# Patient Record
Sex: MALE | Race: WHITE | Employment: FULL TIME | URBAN - METROPOLITAN AREA
[De-identification: names, ages, dates, MRNs, and addresses within clinical notes are randomized per-mention and may not be internally consistent; named-entity substitution may affect disease eponyms.]

---

## 2017-08-10 ENCOUNTER — RADIANT APPOINTMENT (OUTPATIENT)
Dept: GENERAL RADIOLOGY | Facility: CLINIC | Age: 42
End: 2017-08-10
Attending: PHYSICIAN ASSISTANT
Payer: COMMERCIAL

## 2017-08-10 ENCOUNTER — OFFICE VISIT (OUTPATIENT)
Dept: FAMILY MEDICINE | Facility: CLINIC | Age: 42
End: 2017-08-10
Payer: COMMERCIAL

## 2017-08-10 VITALS
WEIGHT: 238 LBS | BODY MASS INDEX: 31.54 KG/M2 | DIASTOLIC BLOOD PRESSURE: 82 MMHG | HEIGHT: 73 IN | HEART RATE: 88 BPM | SYSTOLIC BLOOD PRESSURE: 138 MMHG | TEMPERATURE: 98.6 F

## 2017-08-10 DIAGNOSIS — S93.432A SPRAIN OF TIBIOFIBULAR LIGAMENT OF LEFT ANKLE, INITIAL ENCOUNTER: Primary | ICD-10-CM

## 2017-08-10 DIAGNOSIS — S93.432A SPRAIN OF TIBIOFIBULAR LIGAMENT OF LEFT ANKLE, INITIAL ENCOUNTER: ICD-10-CM

## 2017-08-10 PROCEDURE — 73610 X-RAY EXAM OF ANKLE: CPT | Mod: LT

## 2017-08-10 PROCEDURE — 99214 OFFICE O/P EST MOD 30 MIN: CPT | Performed by: PHYSICIAN ASSISTANT

## 2017-08-10 ASSESSMENT — ENCOUNTER SYMPTOMS
SORE THROAT: 0
SPUTUM PRODUCTION: 0
DIZZINESS: 0
DIAPHORESIS: 0
WEAKNESS: 0
NEUROLOGICAL NEGATIVE: 1
FEVER: 0
WEIGHT LOSS: 0
NERVOUS/ANXIOUS: 0
PHOTOPHOBIA: 0
BLURRED VISION: 0
WHEEZING: 0
DOUBLE VISION: 0
NECK PAIN: 0
MYALGIAS: 0
HALLUCINATIONS: 0
COUGH: 0
PALPITATIONS: 0
LOSS OF CONSCIOUSNESS: 0
ORTHOPNEA: 0
CONSTIPATION: 0
TINGLING: 0
ABDOMINAL PAIN: 0
HEMOPTYSIS: 0
HEARTBURN: 0
DEPRESSION: 0
NAUSEA: 0
EYE REDNESS: 0
DIARRHEA: 0
SEIZURES: 0
FOCAL WEAKNESS: 0
HEADACHES: 0
BLOOD IN STOOL: 0
VOMITING: 0
EYE DISCHARGE: 0
DYSURIA: 0
FREQUENCY: 0
EYE PAIN: 0
SHORTNESS OF BREATH: 0
BACK PAIN: 0
SENSORY CHANGE: 0
INSOMNIA: 0

## 2017-08-10 ASSESSMENT — LIFESTYLE VARIABLES: SUBSTANCE_ABUSE: 0

## 2017-08-10 NOTE — PROGRESS NOTES
HPI  SUBJECTIVE:                                                    Dontae Hi is a 42 year old male who presents to clinic today for left ankle pain that began 4 days ago when he was playing softball. He does not recall a specific significant. His pain has increased over the last couple of days and he has noticed increased swelling      Musculoskeletal problem/pain      Duration: Injury on 8/6/2017 playing softball    Description  Location: left ankle    Intensity:  severe, 7/10    Accompanying signs and symptoms: swelling    History  Previous similar problem: YES- multiple sprains  Previous evaluation:  none    Precipitating or alleviating factors:  Trauma or overuse: YES  Aggravating factors include: walking    Therapies tried and outcome: ice and NSAID - ibuprofen, not helping much            Problem list and histories reviewed & adjusted, as indicated.  Additional history: as documented    Patient Active Problem List   Diagnosis     CARDIOVASCULAR SCREENING; LDL GOAL LESS THAN 160     Elevated BP     Past Surgical History:   Procedure Laterality Date     SURGICAL HISTORY OF -       arthroscopy right knee        Social History   Substance Use Topics     Smoking status: Former Smoker     Packs/day: 0.50     Years: 1.00     Quit date: 2/11/1995     Smokeless tobacco: Former User     Quit date: 2/11/1995     Alcohol use Yes      Comment: 20     Family History   Problem Relation Age of Onset     C.A.D. No family hx of          Current Outpatient Prescriptions   Medication Sig Dispense Refill     NO ACTIVE MEDICATIONS   0     No Known Allergies  Labs reviewed in EPIC      Reviewed and updated as needed this visit by clinical staff     Reviewed and updated as needed this visit by Provider    Review of Systems   Constitutional: Negative for diaphoresis, fever, malaise/fatigue and weight loss.   HENT: Negative for congestion, ear discharge, ear pain, hearing loss, nosebleeds and sore throat.    Eyes: Negative for  blurred vision, double vision, photophobia, pain, discharge and redness.   Respiratory: Negative for cough, hemoptysis, sputum production, shortness of breath and wheezing.    Cardiovascular: Negative for chest pain, palpitations, orthopnea and leg swelling.   Gastrointestinal: Negative for abdominal pain, blood in stool, constipation, diarrhea, heartburn, melena, nausea and vomiting.   Genitourinary: Negative.  Negative for dysuria, frequency and urgency.   Musculoskeletal: Positive for joint pain. Negative for back pain, myalgias and neck pain.   Skin: Negative for itching and rash.   Neurological: Negative.  Negative for dizziness, tingling, sensory change, focal weakness, seizures, loss of consciousness, weakness and headaches.   Endo/Heme/Allergies: Negative.    Psychiatric/Behavioral: Negative for depression, hallucinations, substance abuse and suicidal ideas. The patient is not nervous/anxious and does not have insomnia.          Physical Exam   Constitutional: He is oriented to person, place, and time and well-developed, well-nourished, and in no distress.   HENT:   Head: Normocephalic and atraumatic.   Right Ear: External ear normal.   Left Ear: External ear normal.   Nose: Nose normal.   Mouth/Throat: Oropharynx is clear and moist.   Eyes: Conjunctivae and EOM are normal. Pupils are equal, round, and reactive to light. Right eye exhibits no discharge. Left eye exhibits no discharge. No scleral icterus.   Neck: Normal range of motion. Neck supple. No thyromegaly present.   Cardiovascular: Normal rate, regular rhythm, normal heart sounds and intact distal pulses.  Exam reveals no gallop and no friction rub.    No murmur heard.  Pulmonary/Chest: Effort normal and breath sounds normal. No respiratory distress. He has no wheezes. He has no rales. He exhibits no tenderness.   Abdominal: Soft. Bowel sounds are normal. He exhibits no distension and no mass. There is no tenderness. There is no rebound and no  guarding.   Musculoskeletal: He exhibits no edema.        Left ankle: He exhibits decreased range of motion and swelling. He exhibits no ecchymosis, no deformity and normal pulse. Tenderness. Lateral malleolus tenderness found.   Lymphadenopathy:     He has no cervical adenopathy.   Neurological: He is alert and oriented to person, place, and time. He has normal reflexes. No cranial nerve deficit. He exhibits normal muscle tone. Gait normal. Coordination normal.   Skin: Skin is warm and dry. No rash noted. No erythema.   Psychiatric: Mood, memory, affect and judgment normal.              (B71.064A) Sprain of tibiofibular ligament of left ankle, initial encounter  (primary encounter diagnosis)  Comment:   Plan: XR Ankle Left G/E 3 Views            X-ray shows a slight irregularity in the distal fibula but I do not see an exact fracture line. His symptoms are significant site placed him in a cam walker and will await radiology report. We will contact him with the radiologist result and determine the length of time in the cam walker.

## 2017-08-10 NOTE — MR AVS SNAPSHOT
"              After Visit Summary   8/10/2017    Dontae Hi    MRN: [id number]           Patient Information     Date Of Birth          1975        Visit Information        Provider Department      8/10/2017 2:00 PM Gianluca Campoverde PA-C [facility]        Today's Diagnoses     Sprain of tibiofibular ligament of left ankle, initial encounter    -  1       Follow-ups after your visit        Follow-up notes from your care team     Return if symptoms worsen or fail to improve.      Who to contact     If you have questions or need follow up information about today's clinic visit or your schedule please contact [facility] directly at [phone].  Normal or non-critical lab and imaging results will be communicated to you by MobileGlobehart, letter or phone within 4 business days after the clinic has received the results. If you do not hear from us within 7 days, please contact the clinic through MobileGlobehart or phone. If you have a critical or abnormal lab result, we will notify you by phone as soon as possible.  Submit refill requests through Dajiabao or call your pharmacy and they will forward the refill request to us. Please allow 3 business days for your refill to be completed.          Additional Information About Your Visit        MyChart Information     Dajiabao lets you send messages to your doctor, view your test results, renew your prescriptions, schedule appointments and more. To sign up, go to [email or web address] . Click on \"Log in\" on the left side of the screen, which will take you to the Welcome page. Then click on \"Sign up Now\" on the right side of the page.     You will be asked to enter the access code listed below, as well as some personal information. Please follow the directions to create your username and password.     Your access code is: [email or web address]  Expires: 2017  3:27 PM     Your access code will  in 90 days. If you need help or a new code, please " "call your [facility] or [phone].        Care EveryWhere ID     This is your Care EveryWhere ID. This could be used by other organizations to access your [facility] medical records  [id number]        Your Vitals Were     Pulse Temperature Height BMI (Body Mass Index)          88 98.6  F (37  C) (Tympanic) 6' 1\" (1.854 m) 31.4 kg/m2         Blood Pressure from Last 3 Encounters:   08/10/17 138/82   10/05/16 138/86   02/26/14 165/87    Weight from Last 3 Encounters:   08/10/17 238 lb (108 kg)   10/05/16 242 lb 6.4 oz (110 kg)   02/26/14 234 lb 6.4 oz (106.3 kg)               Primary Care Provider Office Phone # Fax #    Royce Linda [phone] [address] [place] [address]        Equal Access to Services     JADE CALLAHAN : Hadii tina manningo Sojonathan, waaxda luqadaha, qaybta kaalmada adeegyada, maksim otto . So [facility] [phone].    ATENCIÓN: Si husamla español, tiene a schmidt disposición servicios gratuitos de asistencia lingüística. Llame al [phone].    We comply with applicable federal civil rights laws and [place] laws. We do not discriminate on the basis of race, color, national origin, age, disability sex, sexual orientation or gender identity.            Thank you!     Thank you for choosing [facility]  for your care. Our goal is always to provide you with excellent care. Hearing back from our patients is one way we can continue to improve our services. Please take a few minutes to complete the written survey that you may receive in the mail after your visit with us. Thank you!             Your Updated Medication List - Protect others around you: Learn how to safely use, store and throw away your medicines at www.disposemymeds.org.          This list is accurate as of: 8/10/17  3:27 PM.  Always use your most recent med list.                   Brand Name Dispense Instructions for use Diagnosis    NO ACTIVE MEDICATIONS "

## 2017-08-10 NOTE — NURSING NOTE
"Chief Complaint   Patient presents with     Ankle Pain       Initial /82 (BP Location: Right arm, Patient Position: Chair, Cuff Size: Adult Large)  Pulse 88  Temp 98.6  F (37  C) (Tympanic)  Ht 6' 1\" (1.854 m)  Wt 238 lb (108 kg)  BMI 31.4 kg/m2 Estimated body mass index is 31.4 kg/(m^2) as calculated from the following:    Height as of this encounter: 6' 1\" (1.854 m).    Weight as of this encounter: 238 lb (108 kg).  Medication Reconciliation: complete    Health Maintenance that is potentially due pending provider review:  NONE    n/a    Is there anyone who you would like to be able to receive your results? Yes  If yes have patient fill out NOEL    "

## 2019-03-22 ENCOUNTER — OFFICE VISIT (OUTPATIENT)
Dept: URGENT CARE | Facility: URGENT CARE | Age: 44
End: 2019-03-22
Payer: COMMERCIAL

## 2019-03-22 ENCOUNTER — ANCILLARY PROCEDURE (OUTPATIENT)
Dept: GENERAL RADIOLOGY | Facility: CLINIC | Age: 44
End: 2019-03-22
Attending: PHYSICIAN ASSISTANT
Payer: COMMERCIAL

## 2019-03-22 VITALS
SYSTOLIC BLOOD PRESSURE: 170 MMHG | RESPIRATION RATE: 18 BRPM | DIASTOLIC BLOOD PRESSURE: 88 MMHG | TEMPERATURE: 99.4 F | OXYGEN SATURATION: 98 % | HEART RATE: 100 BPM

## 2019-03-22 DIAGNOSIS — S99.912A ANKLE INJURY, LEFT, INITIAL ENCOUNTER: Primary | ICD-10-CM

## 2019-03-22 DIAGNOSIS — S93.402A SPRAIN OF LEFT ANKLE, UNSPECIFIED LIGAMENT, INITIAL ENCOUNTER: ICD-10-CM

## 2019-03-22 DIAGNOSIS — S99.912A ANKLE INJURY, LEFT, INITIAL ENCOUNTER: ICD-10-CM

## 2019-03-22 LAB
BASOPHILS # BLD AUTO: 0 10E9/L (ref 0–0.2)
BASOPHILS NFR BLD AUTO: 0.1 %
DIFFERENTIAL METHOD BLD: NORMAL
EOSINOPHIL # BLD AUTO: 0.2 10E9/L (ref 0–0.7)
EOSINOPHIL NFR BLD AUTO: 2.5 %
ERYTHROCYTE [DISTWIDTH] IN BLOOD BY AUTOMATED COUNT: 12.8 % (ref 10–15)
HCT VFR BLD AUTO: 41.6 % (ref 40–53)
HGB BLD-MCNC: 14.3 G/DL (ref 13.3–17.7)
LYMPHOCYTES # BLD AUTO: 2.1 10E9/L (ref 0.8–5.3)
LYMPHOCYTES NFR BLD AUTO: 30 %
MCH RBC QN AUTO: 30.9 PG (ref 26.5–33)
MCHC RBC AUTO-ENTMCNC: 34.4 G/DL (ref 31.5–36.5)
MCV RBC AUTO: 90 FL (ref 78–100)
MONOCYTES # BLD AUTO: 0.6 10E9/L (ref 0–1.3)
MONOCYTES NFR BLD AUTO: 8.1 %
NEUTROPHILS # BLD AUTO: 4.1 10E9/L (ref 1.6–8.3)
NEUTROPHILS NFR BLD AUTO: 59.3 %
PLATELET # BLD AUTO: 214 10E9/L (ref 150–450)
RBC # BLD AUTO: 4.63 10E12/L (ref 4.4–5.9)
WBC # BLD AUTO: 6.9 10E9/L (ref 4–11)

## 2019-03-22 PROCEDURE — 99213 OFFICE O/P EST LOW 20 MIN: CPT | Performed by: PHYSICIAN ASSISTANT

## 2019-03-22 PROCEDURE — 73610 X-RAY EXAM OF ANKLE: CPT | Mod: LT

## 2019-03-22 PROCEDURE — 36415 COLL VENOUS BLD VENIPUNCTURE: CPT | Performed by: PHYSICIAN ASSISTANT

## 2019-03-22 PROCEDURE — 85025 COMPLETE CBC W/AUTO DIFF WBC: CPT | Performed by: PHYSICIAN ASSISTANT

## 2019-03-22 ASSESSMENT — ENCOUNTER SYMPTOMS
WHEEZING: 0
GASTROINTESTINAL NEGATIVE: 1
ABDOMINAL PAIN: 0
CHEST TIGHTNESS: 0
SHORTNESS OF BREATH: 0
ARTHRALGIAS: 1
FEVER: 0
HEMATURIA: 0
EYE ITCHING: 0
COUGH: 0
HEADACHES: 0
ENDOCRINE NEGATIVE: 1
MYALGIAS: 0
DIARRHEA: 0
PALPITATIONS: 0
RESPIRATORY NEGATIVE: 1
RHINORRHEA: 0
FREQUENCY: 0
POLYDIPSIA: 0
ADENOPATHY: 0
WEAKNESS: 0
DYSURIA: 0
DIAPHORESIS: 0
CARDIOVASCULAR NEGATIVE: 1
CONSTITUTIONAL NEGATIVE: 1
EYE DISCHARGE: 0
LIGHT-HEADEDNESS: 0
NEUROLOGICAL NEGATIVE: 1
EYES NEGATIVE: 1
NAUSEA: 0
SORE THROAT: 0
CHILLS: 0
EYE REDNESS: 0
DIZZINESS: 0
VOMITING: 0

## 2019-03-22 ASSESSMENT — PAIN SCALES - GENERAL: PAINLEVEL: EXTREME PAIN (9)

## 2019-03-23 NOTE — NURSING NOTE
"Chief Complaint   Patient presents with     Musculoskeletal Problem     Stepped in a hole last Saturday and twisted it.  Has just been getting worse since Monday night.  Swelling kicked in Tuesaday. Tried CAM boot during the day. Tried Advil/ Tylenol        Initial /88 (BP Location: Right arm, Patient Position: Chair, Cuff Size: Adult Large)   Pulse 100   Temp 99.4  F (37.4  C) (Tympanic)   Resp 18   SpO2 98%  Estimated body mass index is 31.4 kg/m  as calculated from the following:    Height as of 8/10/17: 1.854 m (6' 1\").    Weight as of 8/10/17: 108 kg (238 lb).    Patient presents to the clinic using Crutches    Health Maintenance that is potentially due pending provider review:  NONE    n/a    Is there anyone who you would like to be able to receive your results? No  If yes have patient fill out NOEL  Elle Islas M.A.        "

## 2019-03-23 NOTE — PROGRESS NOTES
Chief Complaint:    Chief Complaint   Patient presents with     Musculoskeletal Problem     Stepped in a hole last Saturday and twisted it.  Has just been getting worse since Monday night.  Swelling kicked in Tuesaday. Tried CAM boot during the day. Tried Advil/ Tylenol        HPI: Dontae Hi is an 44 year old male who presents for evaluation and treatment of L ankle pain.  Patient stepped in a hole 6 days ago and twisted the ankle.  He has been using a cam walker.  He states that the ankle has become more swollen and painful.  Patient has sprained this ankle in the past.  He denies any numbness or tingling in the L leg.  He is currently not able to walk on the L leg and presents using crutches.      ROS:      Review of Systems   Constitutional: Negative.  Negative for chills, diaphoresis and fever.   HENT: Negative.  Negative for congestion, ear pain, rhinorrhea and sore throat.    Eyes: Negative.  Negative for discharge, redness and itching.   Respiratory: Negative.  Negative for cough, chest tightness, shortness of breath and wheezing.    Cardiovascular: Negative.  Negative for chest pain and palpitations.   Gastrointestinal: Negative.  Negative for abdominal pain, diarrhea, nausea and vomiting.   Endocrine: Negative.  Negative for polydipsia and polyuria.   Genitourinary: Negative for dysuria, frequency, hematuria and urgency.   Musculoskeletal: Positive for arthralgias. Negative for myalgias.   Skin: Negative for rash.   Allergic/Immunologic: Negative for immunocompromised state.   Neurological: Negative.  Negative for dizziness, weakness, light-headedness and headaches.   Hematological: Negative for adenopathy.        Family History   Family History   Problem Relation Age of Onset     C.A.D. No family hx of        Social History  Social History     Socioeconomic History     Marital status: Single     Spouse name: Not on file     Number of children: Not on file     Years of education: Not on file      Highest education level: Not on file   Occupational History     Not on file   Social Needs     Financial resource strain: Not on file     Food insecurity:     Worry: Not on file     Inability: Not on file     Transportation needs:     Medical: Not on file     Non-medical: Not on file   Tobacco Use     Smoking status: Former Smoker     Packs/day: 0.50     Years: 1.00     Pack years: 0.50     Last attempt to quit: 1995     Years since quittin.1     Smokeless tobacco: Former User     Quit date: 1995   Substance and Sexual Activity     Alcohol use: Yes     Comment: 20     Drug use: No     Sexual activity: Yes     Partners: Female   Lifestyle     Physical activity:     Days per week: Not on file     Minutes per session: Not on file     Stress: Not on file   Relationships     Social connections:     Talks on phone: Not on file     Gets together: Not on file     Attends [employer] service: Not on file     Active member of club or organization: Not on file     Attends meetings of clubs or organizations: Not on file     Relationship status: Not on file     Intimate partner violence:     Fear of current or ex partner: Not on file     Emotionally abused: Not on file     Physically abused: Not on file     Forced sexual activity: Not on file   Other Topics Concern     Parent/sibling w/ CABG, MI or angioplasty before 65F 55M? Not Asked   Social History Narrative     Not on file        Surgical History:  Past Surgical History:   Procedure Laterality Date     SURGICAL HISTORY OF -       arthroscopy right knee         Problem List:  Patient Active Problem List   Diagnosis     CARDIOVASCULAR SCREENING; LDL GOAL LESS THAN 160     Elevated BP        Allergies:  No Known Allergies     Current Meds:    Current Outpatient Medications:      NO ACTIVE MEDICATIONS, , Disp: , Rfl: 0     order for DME, Cam walker (Patient not taking: Reported on 3/22/2019), Disp: 1 Units, Rfl: 0     PHYSICAL EXAM:     Vital signs noted and  reviewed by Ronaldo Ochoa  /88 (BP Location: Right arm, Patient Position: Chair, Cuff Size: Adult Large)   Pulse 100   Temp 99.4  F (37.4  C) (Tympanic)   Resp 18   SpO2 98%      PEFR:    Physical Exam   Constitutional: He appears well-developed and well-nourished. He is cooperative.  Non-toxic appearance. He does not have a sickly appearance. He does not appear ill. No distress.   HENT:   Head: Normocephalic and atraumatic.   Right Ear: Hearing, tympanic membrane, external ear and ear canal normal. Tympanic membrane is not perforated, not erythematous, not retracted and not bulging.   Left Ear: Hearing, tympanic membrane, external ear and ear canal normal. Tympanic membrane is not perforated, not erythematous, not retracted and not bulging.   Nose: Nose normal. No mucosal edema or rhinorrhea.   Mouth/Throat: Oropharynx is clear and moist and mucous membranes are normal. No oropharyngeal exudate, posterior oropharyngeal edema, posterior oropharyngeal erythema or tonsillar abscesses. Tonsils are 0 on the right. Tonsils are 0 on the left. No tonsillar exudate.   Eyes: EOM are normal. Pupils are equal, round, and reactive to light.   Neck: Normal range of motion. Neck supple.   Cardiovascular: Normal rate, regular rhythm, S1 normal, S2 normal, normal heart sounds and intact distal pulses. Exam reveals no gallop, no distant heart sounds and no friction rub.   No murmur heard.  Pulmonary/Chest: Effort normal and breath sounds normal. No respiratory distress. He has no decreased breath sounds. He has no wheezes. He has no rhonchi. He has no rales.   Abdominal: Soft. Bowel sounds are normal. He exhibits no distension. There is no tenderness.   Musculoskeletal:        Left ankle: He exhibits decreased range of motion and swelling. He exhibits no deformity. Tenderness. Lateral malleolus and medial malleolus tenderness found.   Significant swelling and erythema of the L ankle.  Reduced range of motion on all axis  due to swelling and pain.  Ankle is warmer to touch.  Pedal pulse is present.  Digits are neurologically intact.   Lymphadenopathy:     He has no cervical adenopathy.   Neurological: He is alert. No cranial nerve deficit.   Skin: Skin is warm and dry. No rash noted. He is not diaphoretic.   Psychiatric: He has a normal mood and affect. His speech is normal and behavior is normal. Judgment and thought content normal. Cognition and memory are normal. He is attentive.   Nursing note and vitals reviewed.       Labs:     Results for orders placed or performed in visit on 03/22/19   CBC with platelets and differential   Result Value Ref Range    WBC 6.9 4.0 - 11.0 10e9/L    RBC Count 4.63 4.4 - 5.9 10e12/L    Hemoglobin 14.3 13.3 - 17.7 g/dL    Hematocrit 41.6 40.0 - 53.0 %    MCV 90 78 - 100 fl    MCH 30.9 26.5 - 33.0 pg    MCHC 34.4 31.5 - 36.5 g/dL    RDW 12.8 10.0 - 15.0 %    Platelet Count 214 150 - 450 10e9/L    % Neutrophils 59.3 %    % Lymphocytes 30.0 %    % Monocytes 8.1 %    % Eosinophils 2.5 %    % Basophils 0.1 %    Absolute Neutrophil 4.1 1.6 - 8.3 10e9/L    Absolute Lymphocytes 2.1 0.8 - 5.3 10e9/L    Absolute Monocytes 0.6 0.0 - 1.3 10e9/L    Absolute Eosinophils 0.2 0.0 - 0.7 10e9/L    Absolute Basophils 0.0 0.0 - 0.2 10e9/L    Diff Method Automated Method          Medical Decision Making:    Differential Diagnosis:  MS Injury Pain: sprain, fracture, tendonitis, muscle strain and contusion      ASSESSMENT:     1. Ankle injury, left, initial encounter    2. Sprain of left ankle, unspecified ligament, initial encounter         PLAN:     XR of the L ankle was negative for any acute fracture per my read.  Patient has significant pain out of character for the injury.  CBC was ordered and was unremarkable  Ice the ankle.  Rest and elevation.  Tylenol and ibuprofen for discomfort.  Ace wrap to help with swelling when walking around.  Continue to use crutches and advance weight bearing as tolerated.  Patient  declined Ortho referral.  He will follow up with his PCP if symptoms are not improving in 2 weeks.  Worrisome symptoms discussed with instructions to go to the ED.  Patient verbalized understanding and agreed with this plan.     Ronaldo Ochoa  3/22/2019, 7:04 PM

## 2022-02-26 ENCOUNTER — HOSPITAL ENCOUNTER (EMERGENCY)
Facility: CLINIC | Age: 47
Discharge: HOME | End: 2022-02-26
Attending: PHYSICIAN ASSISTANT | Admitting: PHYSICIAN ASSISTANT
Payer: COMMERCIAL

## 2022-02-26 ENCOUNTER — OFFICE VISIT (OUTPATIENT)
Dept: URGENT CARE | Facility: URGENT CARE | Age: 47
End: 2022-02-26
Payer: COMMERCIAL

## 2022-02-26 VITALS
HEART RATE: 77 BPM | DIASTOLIC BLOOD PRESSURE: 103 MMHG | WEIGHT: 240 LBS | TEMPERATURE: 97 F | HEIGHT: 73 IN | RESPIRATION RATE: 16 BRPM | BODY MASS INDEX: 31.81 KG/M2 | SYSTOLIC BLOOD PRESSURE: 166 MMHG | OXYGEN SATURATION: 99 %

## 2022-02-26 VITALS
RESPIRATION RATE: 20 BRPM | OXYGEN SATURATION: 98 % | HEART RATE: 102 BPM | SYSTOLIC BLOOD PRESSURE: 158 MMHG | WEIGHT: 240 LBS | DIASTOLIC BLOOD PRESSURE: 84 MMHG | TEMPERATURE: 98.2 F | BODY MASS INDEX: 31.66 KG/M2

## 2022-02-26 DIAGNOSIS — T15.82XA: ICD-10-CM

## 2022-02-26 DIAGNOSIS — H02.814 RETAINED FOREIGN BODY IN LEFT UPPER EYELID: ICD-10-CM

## 2022-02-26 DIAGNOSIS — T15.92XA FOREIGN BODY, EYE, LEFT, INITIAL ENCOUNTER: Primary | ICD-10-CM

## 2022-02-26 PROCEDURE — 250N000009 HC RX 250: Performed by: PHYSICIAN ASSISTANT

## 2022-02-26 PROCEDURE — 99207 PR NON-BILLABLE SERV PER CHARTING: CPT | Performed by: NURSE PRACTITIONER

## 2022-02-26 PROCEDURE — 65222 REMOVE FOREIGN BODY FROM EYE: CPT | Performed by: EMERGENCY MEDICINE

## 2022-02-26 PROCEDURE — 99284 EMERGENCY DEPT VISIT MOD MDM: CPT | Mod: 25 | Performed by: EMERGENCY MEDICINE

## 2022-02-26 PROCEDURE — 65222 REMOVE FOREIGN BODY FROM EYE: CPT

## 2022-02-26 PROCEDURE — 99284 EMERGENCY DEPT VISIT MOD MDM: CPT | Mod: 25

## 2022-02-26 RX ORDER — POLYMYXIN B SULFATE AND TRIMETHOPRIM 1; 10000 MG/ML; [USP'U]/ML
1-2 SOLUTION OPHTHALMIC EVERY 4 HOURS
Qty: 10 ML | Refills: 0 | Status: SHIPPED | OUTPATIENT
Start: 2022-02-26 | End: 2022-03-05

## 2022-02-26 RX ORDER — PROPARACAINE HYDROCHLORIDE 5 MG/ML
1 SOLUTION/ DROPS OPHTHALMIC ONCE
Status: COMPLETED | OUTPATIENT
Start: 2022-02-26 | End: 2022-02-26

## 2022-02-26 RX ADMIN — PROPARACAINE HYDROCHLORIDE 1 DROP: 5 SOLUTION/ DROPS OPHTHALMIC at 12:58

## 2022-02-26 ASSESSMENT — ENCOUNTER SYMPTOMS
EYE REDNESS: 0
PHOTOPHOBIA: 0
EYE PAIN: 0
RESPIRATORY NEGATIVE: 1
EYE ITCHING: 0
CARDIOVASCULAR NEGATIVE: 1
EYE DISCHARGE: 0
CONSTITUTIONAL NEGATIVE: 1

## 2022-02-26 ASSESSMENT — VISUAL ACUITY
OU: 1
OS: 20/20
OD: 20/20

## 2022-02-26 NOTE — DISCHARGE INSTRUCTIONS
To apply eye drops, tip head back, grasp lower eyelid with thumb and index finger, and squeeze bottle to place drop(s) in the eye. Never allow the dropper bottle to touch the eye.    Recommend applying cool compress to affected eye(s) four times per day to relieve irritation and itching. Recommend Zyrtec or Claritin for itching. May also use artificial tears in between antibiotic eyedrop applications.    Return to clinic if symptoms persist more than 5 to 7 days or if you develop symptoms such as sinus pain, headaches, ear pain/discharge.    Advise urgent follow up if you have sudden decreased visual acuity, sudden vision changes, increased pain, headaches, redness/swelling/tenderness around the eyes, fever.

## 2022-02-26 NOTE — PROGRESS NOTES
Has foreign body to left eye.  Eye was numbed and irrigated unable to remove object patient will require an examination with a slit lamp for removal patient will need to be seen in urgent care where they do have a slit lamp if we are not equipped with a slit lamp.    No charge for visit    Cecily Roe CNP

## 2022-02-26 NOTE — ED TRIAGE NOTES
Patient reports a few days ago he got saw dust into his left eye. He believed it was working its way out but started to go lower in the eye and became stuck. Seen at [facility] prior to arrival. Eye was irrigated, FB still remains in left eye. Patient denies any visual disturbances. Does not wear contacts or eye glasses.

## 2022-02-26 NOTE — ED PROVIDER NOTES
History     Chief Complaint   Patient presents with     Foreign Body in Eye     HPI  Dontae Hi is a 47 year old male who presents to the emergency department with a foreign body in his left eye.  He is unsure of when he got the foreign body in his eye but thinks it was 2 days ago.  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  He is unsure of whether he got a piece of sawdust in his eye or piece of metal.  States he first felt it under his eyelid but the foreign body worked its way down.  At one point he thought it was working its way out but it became stuck over the pupil area.  His wife attempted to remove it one day ago but was unsuccessful, was too uncomfortable to work on his eye at that time.  He was seen at [facility] prior to arrival today, was referred here since the foreign body remained in the eye after irrigation.  Denies any acute vision concerns, no trauma, no headaches, no pain with eye movement or photophobia, no drainage from the eye.  The patient does not wear glasses or contacts.  No URI symptoms today, no chest pain or shortness of breath, no difficulties with breathing or swallowing, no nausea or vomiting, no abdominal pain.  No previous injuries to the left eye.    Allergies:  No Known Allergies    Problem List:    Patient Active Problem List    Diagnosis Date Noted     Elevated BP 02/26/2014     Priority: Medium     CARDIOVASCULAR SCREENING; LDL GOAL LESS THAN 160 03/01/2011     Priority: Medium        Past Medical History:    No past medical history on file.    Past Surgical History:    Past Surgical History:   Procedure Laterality Date     SURGICAL HISTORY OF -       arthroscopy right knee        Family History:    Family History   Problem Relation Age of Onset     C.A.D. No family hx of        Social History:  Marital Status:  Single [1]  Social History     Tobacco Use     Smoking status: Former Smoker     Packs/day: 0.50     Years: 1.00     Pack years: 0.50      "Quit date: 1995     Years since quittin.0     Smokeless tobacco: Former User     Quit date: 1995   Substance Use Topics     Alcohol use: Yes     Comment: 20     Drug use: No        Medications:    trimethoprim-polymyxin b (POLYTRIM) 31047-9.1 UNIT/ML-% ophthalmic solution  NO ACTIVE MEDICATIONS  order for DME          Review of Systems   Constitutional: Negative.    HENT: Negative.    Eyes: Positive for visual disturbance. Negative for photophobia, pain, discharge, redness and itching.   Respiratory: Negative.    Cardiovascular: Negative.        Physical Exam   BP: (!) 200/103  Pulse: 98  Temp: 97  F (36.1  C)  Resp: 16  Height: 185.4 cm (6' 1\")  Weight: 108.9 kg (240 lb)  SpO2: 99 %      Physical Exam  Constitutional:       General: He is not in acute distress.     Appearance: Normal appearance. He is not ill-appearing, toxic-appearing or diaphoretic.   HENT:      Head: Normocephalic and atraumatic.      Nose: Nose normal. No congestion or rhinorrhea.      Mouth/Throat:      Mouth: Mucous membranes are moist.      Pharynx: Oropharynx is clear. No oropharyngeal exudate or posterior oropharyngeal erythema.   Eyes:      General: Lids are normal. Vision grossly intact. No visual field deficit or scleral icterus.        Right eye: No discharge.         Left eye: Foreign body present.No discharge.      Extraocular Movements: Extraocular movements intact.      Right eye: Normal extraocular motion and no nystagmus.      Left eye: Normal extraocular motion and no nystagmus.      Conjunctiva/sclera:      Right eye: Right conjunctiva is not injected. No chemosis, exudate or hemorrhage.     Left eye: Left conjunctiva is injected. No chemosis, exudate or hemorrhage.     Pupils: Pupils are equal, round, and reactive to light. Pupils are equal.      Right eye: Pupil is round, reactive and not sluggish.      Left eye: Pupil is round, reactive and not sluggish.      Funduscopic exam:     Right eye: No hemorrhage or " exudate. Red reflex present.         Left eye: No hemorrhage or exudate. Red reflex present.     Slit lamp exam:     Right eye: No corneal flare, corneal ulcer, foreign body, hyphema or hypopyon.      Left eye: No corneal flare, corneal ulcer, foreign body, hyphema or hypopyon.      Visual Fields:      Right eye: DM in the upper temporal quadrant. DM in the upper nasal quadrant. DM in the lower temporal quadrant. DM in the lower nasal quadrant.      Left eye: DM in the upper nasal quadrant. DM in the upper temporal quadrant. DM in the lower nasal quadrant. DM in the lower temporal quadrant.      Comments: Foreign body about 2 mm in diameter noted at 12:00 over the left pupil.  Small foreign body noted at the central portion of the left upper eyelid with eyelid eversion, had fluorescein uptake.  Slit-lamp exam normal after foreign body was removed.   Pulmonary:      Effort: No respiratory distress.      Breath sounds: No wheezing or rales.   Musculoskeletal:         General: No swelling or tenderness. Normal range of motion.      Cervical back: Normal range of motion and neck supple. No rigidity or tenderness. No muscular tenderness.   Skin:     General: Skin is warm and dry.      Findings: No erythema or rash.   Neurological:      General: No focal deficit present.      Mental Status: He is alert and oriented to person, place, and time.      Sensory: No sensory deficit.      Motor: No weakness.      Coordination: Coordination normal.      Gait: Gait normal.   Psychiatric:         Mood and Affect: Mood normal.         Behavior: Behavior normal.         Thought Content: Thought content normal.         Judgment: Judgment normal.         [facility]    Foreign Body Removal - Ocular    Date/Time: 2/26/2022 1:43 PM  Performed by: Jg Daniel PA-C  Authorized by: Jg Daniel PA-C     Risks, benefits and alternatives discussed.      LOCATION      Location:  L corneal    Depth:  Superficial    PRE PROCEDURE DETAILS:     Imaging:  None    OS visual acuity:  20/30    OD visual acuity:  20/20    Correction: uncorrected      Fluorescein exam: yes      Fluorescein uptake: no      ANESTHESIA (see MAR for exact dosages):     Local anesthetic:  Proparacaine drops    PROCEDURE DETAILS     Localization method:  Direct visualization    Removal mechanism:  Moist cotton swab    Foreign bodies recovered:  1    Description:  Small white paint kenyetta-like object    Intact foreign body removal: yes      Residual rust ring observed: no      Residual rust ring removed: no      POST PROCEDURE DETAILS     OS visual acuity:  20/30    OD visual acuity:  20/20    Correction: uncorrected      Confirmation:  No additional foreign bodies on visualization and complete removal verified with slit lamp      PROCEDURE     No corneal abrasion noted. [facility]    Foreign Body Removal - Ocular    Date/Time: 2/26/2022 1:46 PM  Performed by: Jg Daniel PA-C  Authorized by: Jg Daniel PA-C     Risks, benefits and alternatives discussed.      LOCATION     Location:  L upper eyelid    PRE PROCEDURE DETAILS:     Imaging:  None    OS visual acuity:  20/30    OD visual acuity:  20/20    Correction: uncorrected      Fluorescein exam: yes      Fluorescein uptake: yes      ANESTHESIA (see MAR for exact dosages):     Local anesthetic:  Proparacaine drops    PROCEDURE DETAILS     Localization method:  Eyelid eversion    Removal mechanism:  Moist cotton swab    Foreign bodies recovered:  1    Intact foreign body removal: yes      Residual rust ring observed: no      Residual rust ring removed: no      POST PROCEDURE DETAILS     OS visual acuity:  20/30    OD visual acuity:  20/20    Correction: uncorrected      Confirmation:  No additional foreign bodies on visualization and complete removal verified with slit lamp                No results found for this  or any previous visit (from the past 24 hour(s)).    Medications   proparacaine (ALCAINE) 0.5 % ophthalmic solution 1 drop (1 drop Left Eye Given by Other Clinician 2/26/22 3611)       Assessments & Plan (with Medical Decision Making)   The patient is a 47 year old male who presents to the emergency department with a foreign body in his left eye.    No evidence for orbital cellulitis or stye.    Foreign bodies successfully removed as described above.  Starting the patient on antibiotic drops as prophylactic treatment for infection following foreign body removal.    To apply eye drops, tip head back, grasp lower eyelid with thumb and index finger, and squeeze bottle to place drop(s) in the eye. Never allow the dropper bottle to touch the eye.    Recommend applying cool compress to affected eye(s) four times per day to relieve irritation and itching. Recommend Zyrtec or Claritin for itching. May also use artificial tears in between antibiotic eyedrop applications.    Return to clinic if symptoms persist more than 5 to 7 days or if you develop symptoms such as sinus pain, headaches, ear pain/discharge.  Children should not return to school until treated with antibiotic for 24 hours.    Advise urgent follow up if you have sudden decreased visual acuity, sudden vision changes, increased pain, headaches, redness/swelling/tenderness around the eyes, fever.    I have reviewed the nursing notes.    I have reviewed the findings, diagnosis, plan and need for follow up with the patient.      This is a shared visit, history is obtained, foreign body sensation left eye, evaluated by Jg Daniel, foreign body removed at 11:00.  Slit lamp exam shows superficial corneal abrasion at 11:00 no foreign body appreciated.    Agree with contents of note signed Dr. Rodrigo Kamara MD  New Prescriptions    TRIMETHOPRIM-POLYMYXIN B (POLYTRIM) 13014-5.1 UNIT/ML-% OPHTHALMIC SOLUTION    Place 1-2 drops Into the left eye every 4 hours for 7  days       Final diagnoses:   Foreign body of sclera of left eye, initial encounter   Retained foreign body in left upper eyelid       2/26/2022   [facility] EMERGENCY DEPT     Jg Daniel PA-C  02/26/22 1349       Jg Daniel PA-C  02/26/22 [id number]       Rodrigo Kamara MD  02/26/22 6904

## 2025-07-20 ENCOUNTER — HOSPITAL ENCOUNTER (EMERGENCY)
Facility: OTHER | Age: 50
Discharge: HOME | End: 2025-07-20
Payer: COMMERCIAL

## 2025-07-20 VITALS
BODY MASS INDEX: 33.8 KG/M2 | DIASTOLIC BLOOD PRESSURE: 93 MMHG | RESPIRATION RATE: 18 BRPM | TEMPERATURE: 97.5 F | HEART RATE: 98 BPM | WEIGHT: 255 LBS | HEIGHT: 73 IN | OXYGEN SATURATION: 98 % | SYSTOLIC BLOOD PRESSURE: 159 MMHG

## 2025-07-20 DIAGNOSIS — S69.90XA FISH HOOK IN FINGER: ICD-10-CM

## 2025-07-20 PROCEDURE — 250N000011 HC RX IP 250 OP 636

## 2025-07-20 PROCEDURE — 90715 TDAP VACCINE 7 YRS/> IM: CPT

## 2025-07-20 PROCEDURE — 99283 EMERGENCY DEPT VISIT LOW MDM: CPT

## 2025-07-20 PROCEDURE — 90471 IMMUNIZATION ADMIN: CPT

## 2025-07-20 PROCEDURE — 99283 EMERGENCY DEPT VISIT LOW MDM: CPT | Mod: 25

## 2025-07-20 PROCEDURE — 250N000009 HC RX 250

## 2025-07-20 RX ORDER — GINSENG 100 MG
500 CAPSULE ORAL ONCE
Status: COMPLETED | OUTPATIENT
Start: 2025-07-20 | End: 2025-07-20

## 2025-07-20 RX ADMIN — BACITRACIN 1 G: 500 OINTMENT TOPICAL at 13:23

## 2025-07-20 RX ADMIN — CLOSTRIDIUM TETANI TOXOID ANTIGEN (FORMALDEHYDE INACTIVATED), CORYNEBACTERIUM DIPHTHERIAE TOXOID ANTIGEN (FORMALDEHYDE INACTIVATED), BORDETELLA PERTUSSIS TOXOID ANTIGEN (GLUTARALDEHYDE INACTIVATED), BORDETELLA PERTUSSIS FILAMENTOUS HEMAGGLUTININ ANTIGEN (FORMALDEHYDE INACTIVATED), BORDETELLA PERTUSSIS PERTACTIN ANTIGEN, AND BORDETELLA PERTUSSIS FIMBRIAE 2/3 ANTIGEN 0.5 ML: 5; 2; 2.5; 5; 3; 5 INJECTION, SUSPENSION INTRAMUSCULAR at 12:14

## 2025-07-20 RX ADMIN — LIDOCAINE HYDROCHLORIDE 5 ML: 10 INJECTION, SOLUTION EPIDURAL; INFILTRATION; INTRACAUDAL; PERINEURAL at 13:24

## 2025-07-20 ASSESSMENT — ACTIVITIES OF DAILY LIVING (ADL)
ADLS_ACUITY_SCORE: 41
ADLS_ACUITY_SCORE: 41

## 2025-07-20 ASSESSMENT — COLUMBIA-SUICIDE SEVERITY RATING SCALE - C-SSRS
2. HAVE YOU ACTUALLY HAD ANY THOUGHTS OF KILLING YOURSELF IN THE PAST MONTH?: NO
1. IN THE PAST MONTH, HAVE YOU WISHED YOU WERE DEAD OR WISHED YOU COULD GO TO SLEEP AND NOT WAKE UP?: NO
6. HAVE YOU EVER DONE ANYTHING, STARTED TO DO ANYTHING, OR PREPARED TO DO ANYTHING TO END YOUR LIFE?: NO

## 2025-07-20 NOTE — ED TRIAGE NOTES
Fish hook in left thumb.     Triage Assessment (Adult)       Row Name 07/20/25 1142          Triage Assessment    Airway WDL WDL        Respiratory WDL    Respiratory WDL WDL        Peripheral/Neurovascular WDL    Peripheral Neurovascular WDL WDL

## 2025-07-20 NOTE — ED PROVIDER NOTES
"  History     Chief Complaint   Patient presents with    Puncture Wound     Fish hook     The history is provided by the patient and medical records.     Dontae Hi is a 50 year old male who presents to the emergency department today with a fishhook in his left thumb. Last tetanus .     Allergies:  No Known Allergies    Problem List:    Patient Active Problem List    Diagnosis Date Noted    Elevated BP 2014     Priority: Medium    CARDIOVASCULAR SCREENING; LDL GOAL LESS THAN 160 2011     Priority: Medium        Past Medical History:    No past medical history on file.    Past Surgical History:    Past Surgical History:   Procedure Laterality Date    SURGICAL HISTORY OF -       arthroscopy right knee        Family History:    Family History   Problem Relation Age of Onset    C.A.D. No family hx of        Social History:  Marital Status:  Single [1]  Social History     Tobacco Use    Smoking status: Former     Current packs/day: 0.00     Average packs/day: 0.5 packs/day for 1 year (0.5 ttl pk-yrs)     Types: Cigarettes     Start date: 1994     Quit date: 1995     Years since quittin.4    Smokeless tobacco: Former     Quit date: 1995   Substance Use Topics    Alcohol use: Yes     Comment: 20    Drug use: No        Medications:    NO ACTIVE MEDICATIONS  order for DME          Review of Systems   Skin:         Fish hook left thumb       Physical Exam   BP: (!) 190/93  Pulse: 104  Temp: 97.5  F (36.4  C)  Resp: 18  Height: 185.4 cm (6' 1\")  Weight: 115.7 kg (255 lb)  SpO2: 98 %      Physical Exam    ED Course        [facility]    Foreign Body Removal    Date/Time: 2025 12:44 PM    Performed by: Alyssa Pelaez APRN CNP  Authorized by: Alyssa Pelaez APRN CNP    Risks, benefits and alternatives discussed.      LOCATION     Location:  Finger    Finger location:  L thumb    Depth:  Intradermal    Tendon involvement:  None    PRE-PROCEDURE DETAILS     " "Imaging:  None    Neurovascular status: intact    ANESTHESIA (see MAR for exact dosages)     Anesthesia method:  Local infiltration    Local anesthetic:  Lidocaine 1% w/o epi  PROCEDURE TYPE     Procedure complexity:  Simple    PROCEDURE DETAILS     Removal mechanism:  Hemostat    Intact foreign body removal: yes      POST-PROCEDURE DETAILS     Neurovascular status: intact      Confirmation:  No additional foreign bodies on visualization    Skin closure:  None    Dressing:  Adhesive bandage and antibiotic ointment    Patient tolerance of procedure:  Patient tolerated the procedure well with no immediate complications      PROCEDURE    Patient Tolerance:  Patient tolerated the procedure well with no immediate complications        No results found for this or any previous visit (from the past 24 hours).    Medications   Tdap (tetanus-diphtheria-acell pertussis) (ADACEL) injection 0.5 mL (0.5 mLs Intramuscular $Given 7/20/25 1214)   lidocaine 1 % 5 mL (5 mLs Intradermal $Given by Other 7/20/25 1324)   bacitracin ointment 1 g (1 g Topical $Given 7/20/25 1323)       Assessments & Plan (with Medical Decision Making)  Dontae Hi is a 50 year old male who presents to the emergency department today with a fishhook in his left thumb. Last tetanus 2011.   BP (!) 159/93   Pulse 98   Temp 97.5  F (36.4  C)   Resp 18   Ht 1.854 m (6' 1\")   Wt 115.7 kg (255 lb)   SpO2 98%   BMI 33.64 kg/m     Patient here with fishhook in the pad of his left thumb.  CMS intact.  fishhook was successfully removed using a needle .  See procedure note.  Bleeding was minimal.  Wound was thoroughly cleansed and dressing was applied.  Patient is low risk for infection, antibiotics were not indicated.  Tetanus was updated  Patient advised to watch for signs and symptoms of infection, we discussed wound care for home, and strict return precautions. Patient discharged home in stable condition, verbal understanding of discharge " instructions given.      I have reviewed the nursing notes.    I have reviewed the findings, diagnosis, plan and need for follow up with the patient.    Medical Decision Making  The patient's presentation was of straightforward complexity (a clearly self-limited or minor problem).    The patient's evaluation involved:  review of external note(s) from 1 sources (see separate area of note for details)    The patient's management necessitated moderate risk (prescription drug management including medications given in the ED).        New Prescriptions    No medications on file       Final diagnoses:   Fish hook in finger       7/20/2025   [facility], APRN CNP  07/20/25 6986

## (undated) RX ORDER — GINSENG 100 MG
CAPSULE ORAL
Status: DISPENSED
Start: 2025-07-20

## (undated) RX ORDER — LIDOCAINE HYDROCHLORIDE 10 MG/ML
INJECTION, SOLUTION EPIDURAL; INFILTRATION; INTRACAUDAL; PERINEURAL
Status: DISPENSED
Start: 2025-07-20